# Patient Record
Sex: FEMALE | URBAN - METROPOLITAN AREA
[De-identification: names, ages, dates, MRNs, and addresses within clinical notes are randomized per-mention and may not be internally consistent; named-entity substitution may affect disease eponyms.]

---

## 2019-08-01 ENCOUNTER — OFFICE VISIT (OUTPATIENT)
Dept: URBAN - METROPOLITAN AREA CLINIC 32 | Facility: CLINIC | Age: 79
End: 2019-08-01
Payer: MEDICARE

## 2019-08-01 PROCEDURE — 99203 OFFICE O/P NEW LOW 30 MIN: CPT | Performed by: OPTOMETRIST

## 2019-08-01 RX ORDER — LIFITEGRAST 50 MG/ML
5 % SOLUTION/ DROPS OPHTHALMIC
Qty: 180 | Refills: 12 | Status: INACTIVE
Start: 2019-08-01 | End: 2019-08-07

## 2019-08-01 RX ORDER — LOTEPREDNOL ETABONATE 2 MG/ML
0.2 % SUSPENSION/ DROPS OPHTHALMIC
Qty: 1 | Refills: 6 | Status: INACTIVE
Start: 2019-08-01 | End: 2019-08-07

## 2019-08-01 ASSESSMENT — INTRAOCULAR PRESSURE
OS: 15
OD: 15

## 2019-08-01 ASSESSMENT — KERATOMETRY
OD: 45.13
OS: 44.88

## 2019-08-01 NOTE — IMPRESSION/PLAN
Impression: Dry Eye Syndrome: C44.240. Plan: Dry eyes account for the patient's complaints. There is no evidence of permanent changes to the cornea. Explained condition does not have a cure and will need artificial tears for maintenance.

## 2019-09-05 ENCOUNTER — OFFICE VISIT (OUTPATIENT)
Dept: URBAN - METROPOLITAN AREA CLINIC 32 | Facility: CLINIC | Age: 79
End: 2019-09-05
Payer: MEDICARE

## 2019-09-05 DIAGNOSIS — H16.113 MACULAR KERATITIS, BILATERAL: ICD-10-CM

## 2019-09-05 PROCEDURE — 99213 OFFICE O/P EST LOW 20 MIN: CPT | Performed by: OPTOMETRIST

## 2019-09-05 RX ORDER — LIFITEGRAST 50 MG/ML
5 % SOLUTION/ DROPS OPHTHALMIC
Qty: 180 | Refills: 12 | Status: INACTIVE
Start: 2019-09-05 | End: 2019-12-05

## 2019-09-05 RX ORDER — LOTEPREDNOL ETABONATE 2 MG/ML
0.2 % SUSPENSION/ DROPS OPHTHALMIC
Qty: 1 | Refills: 6 | Status: INACTIVE
Start: 2019-09-05 | End: 2019-12-05

## 2019-09-05 ASSESSMENT — INTRAOCULAR PRESSURE
OS: 17
OD: 18

## 2019-09-05 NOTE — IMPRESSION/PLAN
Impression: Dry Eye Syndrome: I48.712. Plan: Dry eyes account for the patient's complaints. There is no evidence of permanent changes to the cornea. Explained condition does not have a cure and will need artificial tears for maintenance.

## 2019-12-05 ENCOUNTER — OFFICE VISIT (OUTPATIENT)
Dept: URBAN - METROPOLITAN AREA CLINIC 32 | Facility: CLINIC | Age: 79
End: 2019-12-05
Payer: MEDICARE

## 2019-12-05 DIAGNOSIS — H04.123 DRY EYE SYNDROME: ICD-10-CM

## 2019-12-05 PROCEDURE — 99213 OFFICE O/P EST LOW 20 MIN: CPT | Performed by: OPTOMETRIST

## 2019-12-05 ASSESSMENT — INTRAOCULAR PRESSURE
OD: 18
OS: 18

## 2019-12-05 NOTE — IMPRESSION/PLAN
Impression: Dry Eye Syndrome: R77.706. Plan: Dry eyes account for the patient's complaints. There is no evidence of permanent changes to the cornea. Explained condition does not have a cure and will need artificial tears for maintenance.

## 2020-08-05 ENCOUNTER — OFFICE VISIT (OUTPATIENT)
Dept: URBAN - METROPOLITAN AREA CLINIC 32 | Facility: CLINIC | Age: 80
End: 2020-08-05
Payer: MEDICARE

## 2020-08-05 DIAGNOSIS — H16.223 KERATOCONJUNCTIVITIS SICCA, NOT SPECIFIED AS SJÖGREN'S, BILATERAL: ICD-10-CM

## 2020-08-05 PROCEDURE — 99213 OFFICE O/P EST LOW 20 MIN: CPT | Performed by: OPTOMETRIST

## 2020-08-05 ASSESSMENT — INTRAOCULAR PRESSURE
OD: 17
OS: 16

## 2020-08-05 NOTE — IMPRESSION/PLAN
Impression: Dry Eye Syndrome: B86.520. Plan: Dry eyes account for the patient's complaints. There is no evidence of permanent changes to the cornea. Explained condition does not have a cure and will need artificial tears for maintenance.